# Patient Record
Sex: MALE | Race: WHITE | NOT HISPANIC OR LATINO | URBAN - METROPOLITAN AREA
[De-identification: names, ages, dates, MRNs, and addresses within clinical notes are randomized per-mention and may not be internally consistent; named-entity substitution may affect disease eponyms.]

---

## 2017-01-17 ENCOUNTER — OUTPATIENT (OUTPATIENT)
Dept: OUTPATIENT SERVICES | Facility: HOSPITAL | Age: 42
LOS: 1 days | Discharge: HOME | End: 2017-01-17

## 2017-01-19 LAB — SURGICAL PATHOLOGY STUDY: SIGNIFICANT CHANGE UP

## 2017-01-27 DIAGNOSIS — K80.10 CALCULUS OF GALLBLADDER WITH CHRONIC CHOLECYSTITIS WITHOUT OBSTRUCTION: ICD-10-CM

## 2019-06-25 PROBLEM — Z00.00 ENCOUNTER FOR PREVENTIVE HEALTH EXAMINATION: Status: ACTIVE | Noted: 2019-06-25

## 2019-08-01 ENCOUNTER — NON-APPOINTMENT (OUTPATIENT)
Age: 44
End: 2019-08-01

## 2019-08-01 ENCOUNTER — APPOINTMENT (OUTPATIENT)
Dept: HEART AND VASCULAR | Facility: CLINIC | Age: 44
End: 2019-08-01
Payer: COMMERCIAL

## 2019-08-01 VITALS
SYSTOLIC BLOOD PRESSURE: 104 MMHG | OXYGEN SATURATION: 98 % | HEART RATE: 79 BPM | HEIGHT: 67 IN | WEIGHT: 162.31 LBS | TEMPERATURE: 98 F | DIASTOLIC BLOOD PRESSURE: 67 MMHG | BODY MASS INDEX: 25.47 KG/M2

## 2019-08-01 DIAGNOSIS — Q21.1 ATRIAL SEPTAL DEFECT: ICD-10-CM

## 2019-08-01 DIAGNOSIS — I49.8 OTHER SPECIFIED CARDIAC ARRHYTHMIAS: ICD-10-CM

## 2019-08-01 DIAGNOSIS — I51.7 CARDIOMEGALY: ICD-10-CM

## 2019-08-01 DIAGNOSIS — Z78.9 OTHER SPECIFIED HEALTH STATUS: ICD-10-CM

## 2019-08-01 DIAGNOSIS — J45.20 MILD INTERMITTENT ASTHMA, UNCOMPLICATED: ICD-10-CM

## 2019-08-01 DIAGNOSIS — Z82.3 FAMILY HISTORY OF STROKE: ICD-10-CM

## 2019-08-01 PROCEDURE — 99205 OFFICE O/P NEW HI 60 MIN: CPT | Mod: 25

## 2019-08-01 PROCEDURE — 93000 ELECTROCARDIOGRAM COMPLETE: CPT

## 2019-08-01 PROCEDURE — 0399T: CPT | Mod: 59

## 2019-08-01 PROCEDURE — 93306 TTE W/DOPPLER COMPLETE: CPT

## 2019-08-01 RX ORDER — BUDESONIDE AND FORMOTEROL FUMARATE DIHYDRATE 160; 4.5 UG/1; UG/1
160-4.5 AEROSOL RESPIRATORY (INHALATION)
Refills: 0 | Status: ACTIVE | COMMUNITY

## 2019-08-01 RX ORDER — CHROMIUM 200 MCG
TABLET ORAL
Refills: 0 | Status: ACTIVE | COMMUNITY

## 2019-08-01 NOTE — PHYSICAL EXAM
[Normal Appearance] : normal appearance [General Appearance - Well Developed] : well developed [General Appearance - Well Nourished] : well nourished [Well Groomed] : well groomed [No Deformities] : no deformities [General Appearance - In No Acute Distress] : no acute distress [Normal Conjunctiva] : the conjunctiva exhibited no abnormalities [Eyelids - No Xanthelasma] : the eyelids demonstrated no xanthelasmas [Normal Oral Mucosa] : normal oral mucosa [No Oral Pallor] : no oral pallor [No Oral Cyanosis] : no oral cyanosis [Normal Jugular Venous A Waves Present] : normal jugular venous A waves present [Normal Jugular Venous V Waves Present] : normal jugular venous V waves present [No Jugular Venous Seymour A Waves] : no jugular venous seymour A waves [Normal Rate] : normal [Normal S1] : normal S1 [Normal S2] : normal S2 [No Murmur] : no murmurs heard [2+] : left 2+ [No Abnormalities] : the abdominal aorta was not enlarged and no bruit was heard [No Pitting Edema] : no pitting edema present [Respiration, Rhythm And Depth] : normal respiratory rhythm and effort [Exaggerated Use Of Accessory Muscles For Inspiration] : no accessory muscle use [Auscultation Breath Sounds / Voice Sounds] : lungs were clear to auscultation bilaterally [Abdomen Soft] : soft [Abdomen Tenderness] : non-tender [Abnormal Walk] : normal gait [Abdomen Mass (___ Cm)] : no abdominal mass palpated [Nail Clubbing] : no clubbing of the fingernails [Gait - Sufficient For Exercise Testing] : the gait was sufficient for exercise testing [Cyanosis, Localized] : no localized cyanosis [Petechial Hemorrhages (___cm)] : no petechial hemorrhages [Skin Color & Pigmentation] : normal skin color and pigmentation [] : no rash [No Venous Stasis] : no venous stasis [Skin Lesions] : no skin lesions [No Xanthoma] : no  xanthoma was observed [No Skin Ulcers] : no skin ulcer [Oriented To Time, Place, And Person] : oriented to person, place, and time [Mood] : the mood was normal [No Anxiety] : not feeling anxious [Affect] : the affect was normal [S3] : no S3 [S4] : no S4 [Right Carotid Bruit] : no bruit heard over the right carotid [Left Carotid Bruit] : no bruit heard over the left carotid [Right Femoral Bruit] : no bruit heard over the right femoral artery [Left Femoral Bruit] : no bruit heard over the left femoral artery

## 2019-08-01 NOTE — REASON FOR VISIT
[Initial Evaluation] : an initial evaluation of [FreeTextEntry1] : Diagnostic Tests:\par ----------------------------------------\par ECG:\par 08/01/19: NSR, low voltage QRS limb leads\par ----------------------------------------\par MR:\par 07/24/19:  LV EF 56%, moderately dilated LV (YUNI 5.7cm, EDV 193ml), RV EF 50%, moderately dilated RV (RV ), dilated LA/RA, atrial septal aneurysm. \par ----------------------------------------\par Stress: \par 05/23/18: ETT: 17.7 METS, normal ECG. \par ----------------------------------------\par Monitor:\par 05/11/18: Medilynx: SR, , AV 70, no pauses.  \par ----------------------------------------\par Echo:\par 08/01/19: LV EF 67%, normal LV and RV size and function, no significant valvular disease, no ASA, no PFO by bubble study, LV GLS -26%.  \par 06/20/18: limited TTE: + agitated saline study. \par 05/23/18: EF 65%, normal RV size and function, atrial septal aneurysm.\par

## 2019-08-01 NOTE — HISTORY OF PRESENT ILLNESS
[FreeTextEntry1] : Mr. Blum presents for initial evaluation and management of patent foramen ovale and biventricular enlargement.  He was evaluated by a cardiologist, Mathieu Conway MD for complaints of palpitations.  He describes the palpitations as brief, occurring several times per week, and nonexertional.  He underwent an extensive cardiac work up which included an mobile cardiac telemetry monitor in May, 2018 which was normal with occasional PVCs.  He then had an echocardiogram on 05/23/18 which was normal except for an atrial septal aneurysm.  He returned for an agitated saline contrast echocardiogram 06/20/18 which revealed intracardiac shunting most consistent with a patent foramen ovale.  He was sent for a cardiac MR on 07/24/19 which revealed LV EF 56%, moderately dilated LV (YUNI 5.7cm, EDV 193ml), RV EF 50%, moderately dilated RV (RV ), dilated LA/RA, atrial septal aneurysm.  His LV and RV dimensions are normal but when indexed to BSA fall into the moderately dilated range.  He denies chest pain or RUVALCABA and has good functional capacity.  I performed an echocardiogram in the office today (08/01/19) which revealed 08/01/19: LV EF 67%, normal LV and RV size and function, no significant valvular disease, no ASA, no PFO by bubble study, LV GLS -26%.  Given normal LV longitudinal strain, pathologic LV dilation would be unlikely.

## 2019-08-01 NOTE — ASSESSMENT
[FreeTextEntry1] : 1. Patent foramen ovale: diagnosed based on atrial septal aneurysm and + agitated saline study\par            - s/p echo: 08/01/19: LV EF 67%, normal LV and RV size and function, no significant valvular disease, no ASA, no PFO by bubble study, LV GLS -26%. \par \par 2. Possible biventricular enlargement: based on indexed LVED and RVED volumes\par            - will have cardiac MR over-read by a cardiac MR imaging expert at [facility]

## 2019-08-02 LAB
ALBUMIN SERPL ELPH-MCNC: 4.9 G/DL
ALP BLD-CCNC: 70 U/L
ALT SERPL-CCNC: 26 U/L
ANION GAP SERPL CALC-SCNC: 11 MMOL/L
AST SERPL-CCNC: 18 U/L
BASOPHILS # BLD AUTO: 0.02 K/UL
BASOPHILS NFR BLD AUTO: 0.4 %
BILIRUB SERPL-MCNC: 0.6 MG/DL
BUN SERPL-MCNC: 15 MG/DL
CALCIUM SERPL-MCNC: 9.3 MG/DL
CHLORIDE SERPL-SCNC: 105 MMOL/L
CHOLEST SERPL-MCNC: 170 MG/DL
CHOLEST/HDLC SERPL: 3 RATIO
CO2 SERPL-SCNC: 25 MMOL/L
CREAT SERPL-MCNC: 1.01 MG/DL
EOSINOPHIL # BLD AUTO: 0.06 K/UL
EOSINOPHIL NFR BLD AUTO: 1.1 %
ESTIMATED AVERAGE GLUCOSE: 108 MG/DL
GLUCOSE SERPL-MCNC: 102 MG/DL
HBA1C MFR BLD HPLC: 5.4 %
HCT VFR BLD CALC: 46.8 %
HDLC SERPL-MCNC: 57 MG/DL
HGB BLD-MCNC: 15 G/DL
IMM GRANULOCYTES NFR BLD AUTO: 0.2 %
LDLC SERPL CALC-MCNC: 95 MG/DL
LDLC SERPL DIRECT ASSAY-MCNC: 105 MG/DL
LYMPHOCYTES # BLD AUTO: 1.34 K/UL
LYMPHOCYTES NFR BLD AUTO: 25.6 %
MAN DIFF?: NORMAL
MCHC RBC-ENTMCNC: 30 PG
MCHC RBC-ENTMCNC: 32.1 GM/DL
MCV RBC AUTO: 93.6 FL
MONOCYTES # BLD AUTO: 0.39 K/UL
MONOCYTES NFR BLD AUTO: 7.5 %
NEUTROPHILS # BLD AUTO: 3.41 K/UL
NEUTROPHILS NFR BLD AUTO: 65.2 %
PLATELET # BLD AUTO: 230 K/UL
POTASSIUM SERPL-SCNC: 4.8 MMOL/L
PROT SERPL-MCNC: 7.6 G/DL
RBC # BLD: 5 M/UL
RBC # FLD: 12.9 %
SODIUM SERPL-SCNC: 141 MMOL/L
TRIGL SERPL-MCNC: 92 MG/DL
TSH SERPL-ACNC: 0.98 UIU/ML
WBC # FLD AUTO: 5.23 K/UL

## 2019-08-02 NOTE — ASSESSMENT
[FreeTextEntry1] : 1. Patent foramen ovale: diagnosed based on atrial septal aneurysm and + agitated saline study\par            - s/p echo: 08/01/19: LV EF 67%, normal LV and RV size and function, no significant valvular disease, no ASA, no PFO by bubble study, LV GLS -26%. \par \par 2. Possible biventricular enlargement: based on indexed LVED and RVED volumes\par            - will have cardiac MR over-read by a cardiac MR imaging expert at [facility]

## 2019-08-02 NOTE — PHYSICAL EXAM
[General Appearance - Well Developed] : well developed [Normal Appearance] : normal appearance [Well Groomed] : well groomed [General Appearance - Well Nourished] : well nourished [General Appearance - In No Acute Distress] : no acute distress [No Deformities] : no deformities [Normal Conjunctiva] : the conjunctiva exhibited no abnormalities [Eyelids - No Xanthelasma] : the eyelids demonstrated no xanthelasmas [Normal Oral Mucosa] : normal oral mucosa [No Oral Pallor] : no oral pallor [No Oral Cyanosis] : no oral cyanosis [Normal Jugular Venous A Waves Present] : normal jugular venous A waves present [No Jugular Venous Seymour A Waves] : no jugular venous seymour A waves [Normal Jugular Venous V Waves Present] : normal jugular venous V waves present [Respiration, Rhythm And Depth] : normal respiratory rhythm and effort [Auscultation Breath Sounds / Voice Sounds] : lungs were clear to auscultation bilaterally [Exaggerated Use Of Accessory Muscles For Inspiration] : no accessory muscle use [Abdomen Soft] : soft [Abdomen Mass (___ Cm)] : no abdominal mass palpated [Abdomen Tenderness] : non-tender [Abnormal Walk] : normal gait [Gait - Sufficient For Exercise Testing] : the gait was sufficient for exercise testing [Nail Clubbing] : no clubbing of the fingernails [Cyanosis, Localized] : no localized cyanosis [Petechial Hemorrhages (___cm)] : no petechial hemorrhages [Skin Color & Pigmentation] : normal skin color and pigmentation [] : no rash [No Venous Stasis] : no venous stasis [Skin Lesions] : no skin lesions [No Skin Ulcers] : no skin ulcer [No Xanthoma] : no  xanthoma was observed [Oriented To Time, Place, And Person] : oriented to person, place, and time [Affect] : the affect was normal [Mood] : the mood was normal [No Anxiety] : not feeling anxious [Normal Rate] : normal [Normal S1] : normal S1 [Normal S2] : normal S2 [No Murmur] : no murmurs heard [2+] : left 2+ [No Abnormalities] : the abdominal aorta was not enlarged and no bruit was heard [No Pitting Edema] : no pitting edema present [S3] : no S3 [S4] : no S4 [Left Carotid Bruit] : no bruit heard over the left carotid [Right Femoral Bruit] : no bruit heard over the right femoral artery [Right Carotid Bruit] : no bruit heard over the right carotid [Left Femoral Bruit] : no bruit heard over the left femoral artery

## 2019-08-02 NOTE — HISTORY OF PRESENT ILLNESS
[FreeTextEntry1] : Mr. Blum presents for initial evaluation and management of patent foramen ovale and biventricular enlargement.  He was evaluated by a cardiologist, Mathieu Conway MD for complaints of palpitations.  He describes the palpitations as brief, occurring several times per week, and nonexertional.  He underwent an extensive cardiac work up which included an mobile cardiac telemetry monitor in May, 2018 which was normal with occasional PVCs.  He then had an echocardiogram on 05/23/18 which was normal except for an atrial septal aneurysm.  He returned for an agitated saline contrast echocardiogram 06/20/18 which revealed intracardiac shunting most consistent with a patent foramen ovale.  He was sent for a cardiac MR on 07/24/19 which revealed LV EF 56%, moderately dilated LV (YUNI 5.7cm, EDV 193ml), RV EF 50%, moderately dilated RV (RV ), dilated LA/RA, atrial septal aneurysm.  His LV and RV dimensions are normal but when indexed to BSA fall into the moderately dilated range.  He denies chest pain or RUVALCABA and has good functional capacity.  I performed an echocardiogram in the office today (08/01/19) which revealed 08/01/19: LV EF 67%, normal LV and RV size and function, no significant valvular disease, no ASA, no PFO by bubble study, LV GLS -26%.  Given normal LV longitudinal strain, pathologic LV dilation would be unlikely.  \par \par 08/01/19: cardiac MR images over-read by [facility] cardiac MR expert. Read as normal study. \par

## 2024-07-16 PROBLEM — Z86.79 HISTORY OF CARDIOMEGALY: Status: RESOLVED | Noted: 2019-08-01 | Resolved: 2024-07-16

## 2024-07-16 PROBLEM — Q21.12 PFO (PATENT FORAMEN OVALE): Status: RESOLVED | Noted: 2019-08-01 | Resolved: 2024-07-16

## 2024-07-16 PROBLEM — I49.8 PERIODIC HEART FLUTTER: Status: RESOLVED | Noted: 2019-08-01 | Resolved: 2024-07-16

## 2024-07-22 ENCOUNTER — NON-APPOINTMENT (OUTPATIENT)
Age: 49
End: 2024-07-22

## 2024-07-22 ENCOUNTER — APPOINTMENT (OUTPATIENT)
Dept: HEART AND VASCULAR | Facility: CLINIC | Age: 49
End: 2024-07-22
Payer: COMMERCIAL

## 2024-07-22 VITALS
TEMPERATURE: 97.8 F | HEART RATE: 62 BPM | BODY MASS INDEX: 25.6 KG/M2 | HEIGHT: 67 IN | OXYGEN SATURATION: 96 % | SYSTOLIC BLOOD PRESSURE: 116 MMHG | WEIGHT: 163.13 LBS | DIASTOLIC BLOOD PRESSURE: 78 MMHG

## 2024-07-22 DIAGNOSIS — I49.8 OTHER SPECIFIED CARDIAC ARRHYTHMIAS: ICD-10-CM

## 2024-07-22 DIAGNOSIS — Q21.12 PATENT FORAMEN OVALE: ICD-10-CM

## 2024-07-22 DIAGNOSIS — Z86.79 PERSONAL HISTORY OF OTHER DISEASES OF THE CIRCULATORY SYSTEM: ICD-10-CM

## 2024-07-22 DIAGNOSIS — R07.9 CHEST PAIN, UNSPECIFIED: ICD-10-CM

## 2024-07-22 PROCEDURE — 99204 OFFICE O/P NEW MOD 45 MIN: CPT | Mod: 25

## 2024-07-22 PROCEDURE — 93000 ELECTROCARDIOGRAM COMPLETE: CPT

## 2024-07-22 PROCEDURE — G2211 COMPLEX E/M VISIT ADD ON: CPT | Mod: NC,1L

## 2024-07-22 NOTE — ASSESSMENT
[FreeTextEntry1] : ======================================================================================= 1. PFO and dilated LV both ruled out:     - no further management at this time   2. Chest pressure and palpitations: mixed typical and atypical features:      - will send for an exercise stress echocardiogram to rule out inducible ischemia      - patient will provide copy of recent lab work from PMD's office for my review

## 2024-07-22 NOTE — REASON FOR VISIT
[FreeTextEntry1] : ======================================================================================= Diagnostic Tests: -------------------------------------------------------------- EC24: sinus bradycardia at 50 bpm, diffuse early repolarization abnormalities.   19: NSR, low voltage QRS limb leads -------------------------------------------------------------- MR: 19:  LV EF 56%, moderately dilated LV (YUNI 5.7cm, EDV 193ml), RV EF 50%, moderately dilated RV (RV ), dilated LA/RA, atrial septal aneurysm.  -------------------------------------------------------------- Stress:  18: ETT: 17.7 METS, normal ECG.  -------------------------------------------------------------- Monitors: 18: Medilynx: SR, , AV 70, no pauses.   -------------------------------------------------------------- Echo: 19: LV EF 67%, normal LV and RV size and function, no significant valvular disease, no ASA, no PFO by bubble study, LV GLS -26%.   18: limited TTE: + agitated saline study.  18: EF 65%, normal RV size and function, atrial septal aneurysm.

## 2024-07-22 NOTE — HISTORY OF PRESENT ILLNESS
[FreeTextEntry1] : Mr. Blum presents for evaluation and management of cardiovascular health screening.  He was evaluated by a cardiologist, Mathieu Conway MD for complaints of palpitations.  He describes the palpitations as brief, occurring several times per week, and non-exertional.  He underwent an extensive cardiac work up which included a mobile cardiac telemetry monitor in May 2018 which was normal with occasional PVCs.  He then had an echocardiogram on 05/23/18 which was normal except for an atrial septal aneurysm.  He returned for an agitated saline contrast echocardiogram 06/20/18 which revealed intracardiac shunting most consistent with a patent foramen ovale.  He was sent for a cardiac MR on 07/24/19 which revealed LV EF 56%, moderately dilated LV (YUNI 5.7cm, EDV 193ml), RV EF 50%, moderately dilated RV (RV ), dilated LA/RA, atrial septal aneurysm.  His LV and RV dimensions are normal but when indexed to BSA fall into the moderately dilated range.  On 08/01/19, he had an echocardiogram which revealed an LV EF of 67%, normal LV and RV size and function, no significant valvular disease, no ASA, no PFO by bubble study, and LV GLS -26%.  Given normal LV longitudinal strain, pathologic LV dilation would be unlikely.  On 08/01/19, his cardiac MR images were over-read by an [facility] cardiac MR expert and were read as normal study.   I last saw him in the office (as an initial visit) on 08/01/19.  At present, he has complaints of occasional chest pressure which he describes an "aching" in his left chest which is non-exertional.  In addition, he describes brief episodes of palpitations.

## 2024-09-19 ENCOUNTER — APPOINTMENT (OUTPATIENT)
Dept: HEART AND VASCULAR | Facility: CLINIC | Age: 49
End: 2024-09-19

## 2024-09-30 ENCOUNTER — NON-APPOINTMENT (OUTPATIENT)
Age: 49
End: 2024-09-30

## 2024-09-30 ENCOUNTER — APPOINTMENT (OUTPATIENT)
Dept: HEART AND VASCULAR | Facility: CLINIC | Age: 49
End: 2024-09-30
Payer: COMMERCIAL

## 2024-09-30 PROCEDURE — 93351 STRESS TTE COMPLETE: CPT

## 2024-09-30 NOTE — HISTORY OF PRESENT ILLNESS
[FreeTextEntry1] : Mr. Blum presents for evaluation and management of cardiovascular health screening.  He was evaluated by a cardiologist, Mathieu Conway MD for complaints of palpitations.  He describes the palpitations as brief, occurring several times per week, and non-exertional.  He underwent an extensive cardiac work up which included a mobile cardiac telemetry monitor in May 2018 which was normal with occasional PVCs.  He then had an echocardiogram on 05/23/18 which was normal except for an atrial septal aneurysm.  He returned for an agitated saline contrast echocardiogram 06/20/18 which revealed intracardiac shunting most consistent with a patent foramen ovale.  He was sent for a cardiac MR on 07/24/19 which revealed LV EF 56%, moderately dilated LV (YUNI 5.7cm, EDV 193ml), RV EF 50%, moderately dilated RV (RV ), dilated LA/RA, atrial septal aneurysm.  His LV and RV dimensions are normal but when indexed to BSA fall into the moderately dilated range.  On 08/01/19, he had an echocardiogram which revealed an LV EF of 67%, normal LV and RV size and function, no significant valvular disease, no ASA, no PFO by bubble study, and LV GLS -26%.  Given normal LV longitudinal strain, pathologic LV dilation would be unlikely.  On 08/01/19, his cardiac MR images were over-read by an [facility] cardiac MR expert and were read as normal study.   I last saw him in the office (as an initial visit) on 08/01/19.  At present, he has complaints of occasional chest pressure which he describes an "aching" in his left chest which is non-exertional.  In addition, he describes brief episodes of palpitations.  On 09/30/24, he had an exercise stress echocardiogram which revealed an EF of 65% and normal wall motion, PA pressures, and ECG post 10.1 METs of exercise.

## 2024-09-30 NOTE — ASSESSMENT
[FreeTextEntry1] : ======================================================================================= 1. PFO and dilated LV both ruled out:     - no further management at this time   2. Chest pressure and palpitations: mixed typical and atypical features: s/p normal stress echo (09/30/24):      - will send for an exercise stress echocardiogram to rule out inducible ischemia      - patient will provide copy of recent lab work from [facility]'s office for my review

## 2024-09-30 NOTE — PHYSICAL EXAM
[General Appearance - Well Developed] : well developed [Normal Appearance] : normal appearance [Well Groomed] : well groomed [General Appearance - Well Nourished] : well nourished [No Deformities] : no deformities [General Appearance - In No Acute Distress] : no acute distress [Normal Conjunctiva] : the conjunctiva exhibited no abnormalities [Eyelids - No Xanthelasma] : the eyelids demonstrated no xanthelasmas [Normal Oral Mucosa] : normal oral mucosa [No Oral Pallor] : no oral pallor [No Oral Cyanosis] : no oral cyanosis [Normal Jugular Venous A Waves Present] : normal jugular venous A waves present [Normal Jugular Venous V Waves Present] : normal jugular venous V waves present [No Jugular Venous Seymour A Waves] : no jugular venous seymour A waves [Respiration, Rhythm And Depth] : normal respiratory rhythm and effort [Exaggerated Use Of Accessory Muscles For Inspiration] : no accessory muscle use [Auscultation Breath Sounds / Voice Sounds] : lungs were clear to auscultation bilaterally [Abdomen Soft] : soft [Abdomen Tenderness] : non-tender [Abdomen Mass (___ Cm)] : no abdominal mass palpated [Abnormal Walk] : normal gait [Gait - Sufficient For Exercise Testing] : the gait was sufficient for exercise testing [Nail Clubbing] : no clubbing of the fingernails [Cyanosis, Localized] : no localized cyanosis [Petechial Hemorrhages (___cm)] : no petechial hemorrhages [Skin Color & Pigmentation] : normal skin color and pigmentation [] : no rash [No Venous Stasis] : no venous stasis [Skin Lesions] : no skin lesions [No Skin Ulcers] : no skin ulcer [No Xanthoma] : no  xanthoma was observed [Oriented To Time, Place, And Person] : oriented to person, place, and time [Affect] : the affect was normal [Mood] : the mood was normal [No Anxiety] : not feeling anxious [Normal Rate] : normal [Normal S1] : normal S1 [Normal S2] : normal S2 [No Murmur] : no murmurs heard [2+] : left 2+ [No Abnormalities] : the abdominal aorta was not enlarged and no bruit was heard [No Pitting Edema] : no pitting edema present [S3] : no S3 [S4] : no S4 [Right Carotid Bruit] : no bruit heard over the right carotid [Left Carotid Bruit] : no bruit heard over the left carotid [Right Femoral Bruit] : no bruit heard over the right femoral artery [Left Femoral Bruit] : no bruit heard over the left femoral artery

## 2025-01-23 ENCOUNTER — APPOINTMENT (OUTPATIENT)
Dept: HEART AND VASCULAR | Facility: CLINIC | Age: 50
End: 2025-01-23